# Patient Record
Sex: FEMALE | Race: BLACK OR AFRICAN AMERICAN | Employment: FULL TIME | URBAN - METROPOLITAN AREA
[De-identification: names, ages, dates, MRNs, and addresses within clinical notes are randomized per-mention and may not be internally consistent; named-entity substitution may affect disease eponyms.]

---

## 2018-04-16 ENCOUNTER — THERAPY VISIT (OUTPATIENT)
Dept: PHYSICAL THERAPY | Facility: CLINIC | Age: 48
End: 2018-04-16
Payer: OTHER MISCELLANEOUS

## 2018-04-16 DIAGNOSIS — M54.50 LUMBAGO: ICD-10-CM

## 2018-04-16 DIAGNOSIS — M25.551 HIP PAIN, RIGHT: Primary | ICD-10-CM

## 2018-04-16 PROCEDURE — 97110 THERAPEUTIC EXERCISES: CPT | Mod: GP | Performed by: PHYSICAL THERAPIST

## 2018-04-16 PROCEDURE — 97161 PT EVAL LOW COMPLEX 20 MIN: CPT | Mod: GP | Performed by: PHYSICAL THERAPIST

## 2018-04-16 ASSESSMENT — ACTIVITIES OF DAILY LIVING (ADL)
GETTING_INTO_AND_OUT_OF_A_BATHTUB: SLIGHT DIFFICULTY
GOING_UP_1_FLIGHT_OF_STAIRS: MODERATE DIFFICULTY
STANDING_FOR_15_MINUTES: MODERATE DIFFICULTY
ROLLING_OVER_IN_BED: EXTREME DIFFICULTY
GOING_DOWN_1_FLIGHT_OF_STAIRS: MODERATE DIFFICULTY
HOS_ADL_ITEM_SCORE_TOTAL: 24
HOS_ADL_COUNT: 10
PUTTING_ON_SOCKS_AND_SHOES: NO DIFFICULTY AT ALL
STEPPING_UP_AND_DOWN_CURBS: SLIGHT DIFFICULTY
HOS_ADL_HIGHEST_POTENTIAL_SCORE: 40
HEAVY_WORK: MODERATE DIFFICULTY
LIGHT_TO_MODERATE_WORK: SLIGHT DIFFICULTY
SITTING_FOR_15_MINUTES: MODERATE DIFFICULTY
WALKING_INITIALLY: NO DIFFICULTY AT ALL
GETTING_INTO_AND_OUT_OF_AN_AVERAGE_CAR: MODERATE DIFFICULTY

## 2018-04-16 NOTE — MR AVS SNAPSHOT
"              After Visit Summary   4/16/2018    Nieves Connell    MRN: [id number]           Patient Information     Date Of Birth          1970        Visit Information        Provider Department      4/16/2018 4:40 PM Bonnie Mcmahon PT [facility]        Today's Diagnoses     Hip pain, right    -  1    Lumbago           Follow-ups after your visit        Your next 10 appointments already scheduled     Apr 23, 2018 12:30 PM CDT   RENITA Extremity with Gladis Osborne ATC   [facility] ([facility]  )    [address] [place] [id number] [phone]            Apr 30, 2018 12:30 PM CDT   RENITA Extremity with Gladis Osborne ATC   [facility] ([facility]  )    [address] [place] [id number] [phone]              Who to contact     If you have questions or need follow up information about today's clinic visit or your schedule please contact [facility] PHYSICAL THERAPY directly at [phone].  Normal or non-critical lab and imaging results will be communicated to you by MyChart, letter or phone within 4 business days after the clinic has received the results. If you do not hear from us within 7 days, please contact the clinic through [employer]ed Inference[employer] or phone. If you have a critical or abnormal lab result, we will notify you by phone as soon as possible.  Submit refill requests through Tradoria or call your pharmacy and they will forward the refill request to us. Please allow 3 business days for your refill to be completed.          Additional Information About Your Visit        MyChart Information     Tradoria lets you send messages to your doctor, view your test results, renew your prescriptions, schedule appointments and more. To sign up, go to [email or web address] . Click on \"Log " "in\" on the left side of the screen, which will take you to the Welcome page. Then click on \"Sign up Now\" on the right side of the page.     You will be asked to enter the access code listed below, as well as some personal information. Please follow the directions to create your username and password.     Your access code is: [email or web address]  Expires: 7/15/2018  9:56 PM     Your access code will  in 90 days. If you need help or a new code, please call your [place] clinic or [phone].        Care EveryWhere ID     This is your Care EveryWhere ID. This could be used by other organizations to access your [place] medical records  [id number]         Blood Pressure from Last 3 Encounters:   14 118/80   14 112/82   13 126/82    Weight from Last 3 Encounters:   07 129.3 kg (285 lb)              We Performed the Following     HC PT EVAL, LOW COMPLEXITY     RENITA INITIAL EVAL REPORT     THERAPEUTIC EXERCISES        Primary Care Provider Office Phone # Fax #    Martha Desouza [phone]       [facility] [address] [facility] [address]        Equal Access to Services     GUILLERMO SERNA : Hadii homero ku hadasho Soomaali, waaxda luqadaha, qaybta kaalmada adeegyada, waxay angeliain haysergon sarabjit toscano. So [facility] [phone].    ATENCIÓN: Si habla español, tiene a mcnulty disposición servicios gratuitos de asistencia lingüística. Llame al [phone].    We comply with applicable federal civil rights laws and [place] laws. We do not discriminate on the basis of race, color, national origin, age, disability, sex, sexual orientation, or gender identity.            Thank you!     Thank you for choosing [facility] PHYSICAL THERAP[facility]  for your care. Our goal is always to provide you with excellent care. Hearing back from our patients is one way we can continue to improve our services. Please take a few minutes to complete the written survey " that you may receive in the mail after your visit with us. Thank you!             Your Updated Medication List - Protect others around you: Learn how to safely use, store and throw away your medicines at www.disposemymeds.org.          This list is accurate as of 4/16/18  9:56 PM.  Always use your most recent med list.                   Brand Name Dispense Instructions for use Diagnosis    cyclobenzaprine 10 MG tablet    FLEXERIL     Take 10 mg by mouth daily as needed.        diphenhydrAMINE aminophen 25 MG ED starter pack    BENADRYL     Take 1 capsule by mouth daily.        hydrocortisone 2.5 % cream      Apply  topically daily. Patient does not always use it daily        hydroxychloroquine 200 MG tablet    PLAQUENIL     Take 2 tablets by mouth daily        predniSONE 10 MG tablet    DELTASONE     Take  by mouth daily.        TRIAMCINOLONE & EMOLLIENT EX      Externally apply  topically daily. Patient does not always use daily        vitamin D 41986 UNIT capsule    ERGOCALCIFEROL    16 capsule    Take 1 capsule (50,000 Units) by mouth See Admin Instructions for 8 doses    Disturbance of skin sensation

## 2018-04-16 NOTE — PROGRESS NOTES
[facility]  System  Physical Exam  General   [facility]          Physical Therapy Initial Examination/Evaluation April 16, 2018   Nieves Connell is a 47 year old female referred to physical therapy for treatment of R hip and Leg pain  with Precautions/Restrictions/MD instructions Pt having order sent over   Therapist Assessment:   Clinical Impression: Pt  presents to [facility] with primary complaint of R hip and Leg pain.  Evaluation ongoing as pt arriving late to appt and needing time to fill out paperwork. Pain symptoms irritable this date so difficult to identify cause with special testing.   Per clinical examination, pt does appear to prefer extension-based movement.  Pain increasing in R hip/groin with active activation of R LE.  Pain also present with palpation to R glutes. Pt will benefit from skilled physical therapy for stretching and stabilization program. Pt also may benefit from MET for pelvic asymmetry.   Subjective: Pt fell onto buttocks with brunt of force on R side. Pain started on R side and has transitioned to midline with pain radiating down R LE all the way to her toes.  Pain also radiates into groin and around the front of her leg.  Pt purchased a pillow to sit on, which was helpful.    DOI/onset: 3/6/2018   Mechanism of injury: slip and fall on ice   DOS NA   Related PMH: chronic dislocation of R shoulder Previous treatment: went to chiropractor today for pain  Imaging: MRI of R hip and pelvis per pt were negative   Chief Complaint: R hip and LE pain    Pain: rest 6/10, activity 8/10  Described as: burning/fire Alleviated by: Standing , movement Exacerbated by:  Sitting, standing, walking too long Progression of symptoms since initial onset: improving  Time of day when pain is worse: none in particular   Sleeping: Sleeps on stomach and side; typically is a stomach sleeper    Social history: Lives with 23 y.o. Son ; finishing bachelor degree in sociology    Occupation:    Job duties: Prolonged sitting, computer work    Current HEP/exercise regimen: No formal exercise   Patient's goals are decrease pain    Other pertinent PMH: overweight, numbness/tingling General health as reported by patient: Good    Return to MD: prn   Lumbar Spine Evaluation  Static Posture  Foot: Pes planus/cavus: WNL    Knee: Varus/Valgus: slight valgus     Hip: Adduction/IR: slight IR    Lumbar Spine:  Normal lordosis,  Anterior pelvic tilt  L ASIS higher than R ASIS    Trunk Range of Motion  Flexion: WNL  Extension: 75%   Side bendin% to the R, 50% to the L with stretching on R side  Rotation: 75% to the L no pain; 50% to the R with pain   Hamstring: WNL bilat    Quadriceps/Hip Flexor: WNL      Hip and Knee Strength   MMT Hip Abduction Hip Extension Hip Flexion    Left 4/5 4-/5 4/5   Right 4-/5 4-/5 4-/5     Special Tests  Neural tension: negative  Myotomes: Negative    Assessment/Plan:    Patient is a 47 year old female with lumbar and right side hip complaints.    Patient has the following significant findings with corresponding treatment plan.                Diagnosis 1:  Low Back Pain, R hip pain   Pain -  hot/cold therapy, manual therapy, education, directional preference exercise and home program  Decreased ROM/flexibility - manual therapy, therapeutic exercise, therapeutic activity and home program  Decreased strength - therapeutic exercise, therapeutic activities and home program  Impaired muscle performance - neuro re-education and home program  Decreased function - therapeutic activities and home program    Therapy Evaluation Codes:   1) History comprised of:   Personal factors that impact the plan of care:      Past/current experiences and Time since onset of symptoms.    Comorbidity factors that impact the plan of care are:      Numbness/tingling and Overweight.     Medications impacting care: Anti-inflammatory and Pain.  2) Examination of Body Systems comprised of:   Body structures  and functions that impact the plan of care:      Hip and Lumbar spine.   Activity limitations that impact the plan of care are:      Sitting, Standing, Walking and Working.  3) Clinical presentation characteristics are:   Stable/Uncomplicated.  4) Decision-Making    Low complexity using standardized patient assessment instrument and/or measureable assessment of functional outcome.  Cumulative Therapy Evaluation is: Low complexity.    Previous and current functional limitations:  (See Goal Flow Sheet for this information)    Short term and Long term goals: (See Goal Flow Sheet for this information)     Communication ability:  Patient appears to be able to clearly communicate and understand verbal and written communication and follow directions correctly.  Treatment Explanation - The following has been discussed with the patient:   RX ordered/plan of care  Anticipated outcomes  Possible risks and side effects  This patient would benefit from PT intervention to resume normal activities.   Rehab potential is good.    Frequency:  1 X week, once daily  Duration:  for 6 weeks  Discharge Plan:  Achieve all LTG.  Independent in home treatment program.  Reach maximal therapeutic benefit.    Please refer to the daily flowsheet for treatment today, total treatment time and time spent performing 1:1 timed codes.        .

## 2018-04-16 NOTE — LETTER
[facility] PHYSICAL THERAPY  [address] [address]  [phone]  2018    Re: Nieves Connell   :   1970  MRN:  [id number]   REFERRING PHYSICIAN:   Zohaib Hyatt    [facility] PHYSICAL [facility]    Date of Initial Evaluation:  18  Visits:  Rxs Used: 1  Reason for Referral:     Hip pain, right  Lumbago    Physical Therapy Initial Examination/Evaluation 2018   Nieves Connell is a 47 year old female referred to physical therapy for treatment of R hip and Leg pain  with Precautions/Restrictions/MD instructions Pt having order sent over   Therapist Assessment:   Clinical Impression: Pt  presents to [facility] with primary complaint of R hip and Leg pain.  Evaluation ongoing as pt arriving late to appt and needing time to fill out paperwork. Pain symptoms irritable this date so difficult to identify cause with special testing.   Per clinical examination, pt does appear to prefer extension-based movement.  Pain increasing in R hip/groin with active activation of R LE.  Pain also present with palpation to R glutes. Pt will benefit from skilled physical therapy for stretching and stabilization program. Pt also may benefit from MET for pelvic asymmetry.   Subjective: Pt fell onto buttocks with brunt of force on R side. Pain started on R side and has transitioned to midline with pain radiating down R LE all the way to her toes.  Pain also radiates into groin and around the front of her leg.  Pt purchased a pillow to sit on, which was helpful.    DOI/onset: 3/6/2018   Mechanism of injury: slip and fall on ice   DOS NA   Related PMH: chronic dislocation of R shoulder Previous treatment: went to chiropractor today for pain  Imaging: MRI of R hip and pelvis per pt were negative   Chief Complaint: R hip and LE pain    Pain: rest 6/10, activity 8/10   Described as: burning/fire Alleviated by: Standing , movement  Exacerbated by:  Sitting, standing, walking too long Progression of symptoms since initial  Re: Nieves SP Connell   :   1970    onset: improving  Time of day when pain is worse: none in particular   Sleeping: Sleeps on stomach and side; typically is a stomach sleeper    Social history: Lives with 23 y.o. Son ; finishing bachelor degree in sociology    Occupation:   Job duties: Prolonged sitting, computer work    Current HEP/exercise regimen: No formal exercise   Patient's goals are decrease pain    Other pertinent PMH: overweight, numbness/tingling General health as reported by patient: Good    Return to MD: prn   Lumbar Spine Evaluation  Static Posture  Foot: Pes planus/cavus: WNL    Knee: Varus/Valgus: slight valgus    Hip: Adduction/IR: slight IR  Lumbar Spine:  Normal lordosis,  Anterior pelvic tilt  L ASIS higher than R ASIS    Trunk Range of Motion  Flexion: WNL  Extension: 75%   Side bendin% to the R, 50% to the L with stretching on R side  Rotation: 75% to the L no pain; 50% to the R with pain   Hamstring: WNL bilat    Quadriceps/Hip Flexor: WNL    Hip and Knee Strength   MMT Hip Abduction Hip Extension Hip Flexion    Left 4/5 4-/5 4/5   Right 4-/5 4-/5 4-/5     Special Tests  Neural tension: negative  Myotomes: Negative  Assessment/Plan:    Patient is a 47 year old female with lumbar and right side hip complaints.    Patient has the following significant findings with corresponding treatment plan.                Diagnosis 1:  Low Back Pain, R hip pain   Pain -  hot/cold therapy, manual therapy, education, directional preference exercise and home program  Decreased ROM/flexibility - manual therapy, therapeutic exercise, therapeutic activity and home program  Decreased strength - therapeutic exercise, therapeutic activities and home program  Impaired muscle performance - neuro re-education and home program  Decreased  function - therapeutic activities and home program    Re: Nieves Connell   :   1970    Therapy Evaluation Codes:   1) History comprised of:   Personal factors that impact the plan of care:      Past/current experiences and Time since onset of symptoms.    Comorbidity factors that impact the plan of care are:      Numbness/tingling and Overweight.     Medications impacting care: Anti-inflammatory and Pain.  2) Examination of Body Systems comprised of:   Body structures and functions that impact the plan of care:      Hip and Lumbar spine.   Activity limitations that impact the plan of care are:      Sitting, Standing, Walking and Working.  3) Clinical presentation characteristics are:   Stable/Uncomplicated.  4) Decision-Making    Low complexity using standardized patient assessment instrument and/or   measureable assessment of functional outcome.  Cumulative Therapy Evaluation is: Low complexity.    Previous and current functional limitations:  (See Goal Flow Sheet for this information)    Short term and Long term goals: (See Goal Flow Sheet for this information)     Communication ability:  Patient appears to be able to clearly communicate and understand verbal and written communication and follow directions correctly.  Treatment Explanation - The following has been discussed with the patient:   RX ordered/plan of care  Anticipated outcomes  Possible risks and side effects  This patient would benefit from PT intervention to resume normal activities.   Rehab potential is good.    Frequency:  1 X week, once daily  Duration:  for 6 weeks  Discharge Plan:  Achieve all LTG.  Independent in home treatment program.  Reach maximal therapeutic benefit.    Please refer to the daily flowsheet for treatment today, total treatment time and time spent performing 1:1 timed codes.        Thank you for your referral.    INQUIRIES  Therapist: Bonnie Mcmahon DPT  [facility]  [address] [address]  Phone: [phone]  Fax: [phone]

## 2018-04-23 ENCOUNTER — THERAPY VISIT (OUTPATIENT)
Dept: PHYSICAL THERAPY | Facility: CLINIC | Age: 48
End: 2018-04-23
Payer: OTHER MISCELLANEOUS

## 2018-04-23 DIAGNOSIS — M54.50 LUMBAGO: ICD-10-CM

## 2018-04-23 DIAGNOSIS — M25.551 HIP PAIN, RIGHT: Primary | ICD-10-CM

## 2018-04-23 PROCEDURE — 97112 NEUROMUSCULAR REEDUCATION: CPT | Mod: GP

## 2018-04-23 PROCEDURE — 97110 THERAPEUTIC EXERCISES: CPT | Mod: GP

## 2018-05-07 ENCOUNTER — THERAPY VISIT (OUTPATIENT)
Dept: PHYSICAL THERAPY | Facility: CLINIC | Age: 48
End: 2018-05-07
Payer: OTHER MISCELLANEOUS

## 2018-05-07 DIAGNOSIS — M25.551 HIP PAIN, RIGHT: Primary | ICD-10-CM

## 2018-05-07 DIAGNOSIS — M54.50 LUMBAGO: ICD-10-CM

## 2018-05-07 PROCEDURE — 97112 NEUROMUSCULAR REEDUCATION: CPT | Mod: GP

## 2018-05-07 PROCEDURE — 97110 THERAPEUTIC EXERCISES: CPT | Mod: GP

## 2018-05-14 ENCOUNTER — THERAPY VISIT (OUTPATIENT)
Dept: PHYSICAL THERAPY | Facility: CLINIC | Age: 48
End: 2018-05-14
Payer: OTHER MISCELLANEOUS

## 2018-05-14 DIAGNOSIS — M54.50 LUMBAGO: ICD-10-CM

## 2018-05-14 DIAGNOSIS — M25.551 HIP PAIN, RIGHT: Primary | ICD-10-CM

## 2018-05-14 PROCEDURE — 97112 NEUROMUSCULAR REEDUCATION: CPT | Mod: GP

## 2018-05-14 PROCEDURE — 97110 THERAPEUTIC EXERCISES: CPT | Mod: GP

## 2018-05-14 NOTE — MR AVS SNAPSHOT
"              After Visit Summary   5/14/2018    Nieves Connell    MRN: [id number]           Patient Information     Date Of Birth          1970        Visit Information        Provider Department      5/14/2018 10:30 AM Gladis Osborne ATC [facility]        Today's Diagnoses     Hip pain, right    -  1    Lumbago           Follow-ups after your visit        Your next 10 appointments already scheduled     May 21, 2018  3:40 PM CDT   RENITA Extremity with Gladis Osborne ATC   [facility] ([facility]  )    [address] [place] [address]   [phone]            May 30, 2018  3:20 PM CDT   RENITA Extremity with Gladis Osborne ATC   [facility] ([facility]  )    [address] [place] [address] [phone]              Who to contact     If you have questions or need follow up information about today's clinic visit or your schedule please contact [facility] PHYSICAL THERAPY directly at [phone].  Normal or non-critical lab and imaging results will be communicated to you by MyChart, letter or phone within 4 business days after the clinic has received the results. If you do not hear from us within 7 days, please contact the clinic through [employer] or phone. If you have a critical or abnormal lab result, we will notify you by phone as soon as possible.  Submit refill requests through Mobilepolice or call your pharmacy and they will forward the refill request to us. Please allow 3 business days for your refill to be completed.          Additional Information About Your Visit        MyChart Information     Mobile[employer] lets you send messages to your doctor, view your test results, renew your prescriptions, schedule appointments and more. To sign up, go to [email or web address] . Click on \"Log " "in\" on the left side of the screen, which will take you to the Welcome page. Then click on \"Sign up Now\" on the right side of the page.     You will be asked to enter the access code listed below, as well as some personal information. Please follow the directions to create your username and password.     Your access code is: [email or web address]  Expires: 7/15/2018  9:56 PM     Your access code will  in 90 days. If you need help or a new code, please call your [place] clinic or [phone].        Care EveryWhere ID     This is your Care EveryWhere ID. This could be used by other organizations to access your [place] medical records  [id number]         Blood Pressure from Last 3 Encounters:   14 118/80   14 112/82   13 126/82    Weight from Last 3 Encounters:   07 129.3 kg (285 lb)              We Performed the Following     Neuromuscular Re-Education     Therapeutic Exercises        Primary Care Provider Office Phone # Fax #    Martha Gomez Deo [phone]       [facility] [address] [facility] [address]        Equal Access to Services     GUILLERMO SERNA AH: Hadii homero lemuso Soefren, waaxda luqadaha, qaybta kaalmada adeyomairayada, eros toscano. So [facility] [phone].    ATENCIÓN: Si habla español, tiene a mcnulty disposición servicios gratuitos de asistencia lingüística. Jef al [phone].    We comply with applicable federal civil rights laws and [place] laws. We do not discriminate on the basis of race, color, national origin, age, disability, sex, sexual orientation, or gender identity.            Thank you!     Thank you for choosing [facility] PH[facility]ICAL THERAP[facility]  for your care. Our goal is always to provide you with excellent care. Hearing back from our patients is one way we can continue to improve our services. Please take a few minutes to complete the written survey that you may receive in the " mail after your visit with us. Thank you!             Your Updated Medication List - Protect others around you: Learn how to safely use, store and throw away your medicines at www.disposemymeds.org.          This list is accurate as of 5/14/18 12:13 PM.  Always use your most recent med list.                   Brand Name Dispense Instructions for use Diagnosis    cyclobenzaprine 10 MG tablet    FLEXERIL     Take 10 mg by mouth daily as needed.        diphenhydrAMINE aminophen 25 MG ED starter pack    BENADRYL     Take 1 capsule by mouth daily.        hydrocortisone 2.5 % cream      Apply  topically daily. Patient does not always use it daily        hydroxychloroquine 200 MG tablet    PLAQUENIL     Take 2 tablets by mouth daily        predniSONE 10 MG tablet    DELTASONE     Take  by mouth daily.        TRIAMCINOLONE & EMOLLIENT EX      Externally apply  topically daily. Patient does not always use daily        vitamin D 86352 UNIT capsule    ERGOCALCIFEROL    16 capsule    Take 1 capsule (50,000 Units) by mouth See Admin Instructions for 8 doses    Disturbance of skin sensation

## 2018-05-14 NOTE — LETTER
[facility] ATHLETIC [facility] PHYSICAL THERAPY  [address] [facility] [address]  [phone]  May 14, 2018    Re: Nieves SNOWDEN Zeina Connell   :   1970  MRN:  [id number]   REFERRING PHYSICIAN:   Zohaib Hyatt    [facility] ATHLETIC [facility] PHYSICAL [facility]    Date of Initial Evaluation:  18  Visits:  Rxs Used: 4  Reason for Referral:     Lumbago  Hip pain, right  PROGRESS  REPORT    Progress reporting period is from 2018 to 2018.     SUBJECTIVE  Subjective: doing well until she went to work this morning and started having burning in leg after about 3 hours, an had been closer to the 5 hour farhan.Sitting position triggers the pain.   Progression of exercises are slow.  Patient is deconditioned and fatigues easily but willing to try exercises as she can Current Pain level: 6/10   Initial Pain level: 8/10   Changes in function: Yes, see goal flow sheet for change in function. beginning to tolerate sitting for a longer period of time but progress is still volital  Adverse reactions: None  The subjective and objective information are from the last SOAP note on this patient.    OBJECTIVE  Objective:  along the right side SI joint- tender to the touch, burning in leg down to toes. lsight pelvic rotation noted as well. Poor muscle strenght to progress ex program, but was doing a few ex to help stabilize pelvis       ASSESSMENT/PLAN  Updated problem list and treatment plan: Diagnosis 1:  Right hip pain, SI (area)pain  Pain -  hot/cold therapy, manual therapy, splint/taping/bracing/orthotics and self management  Decreased strength - therapeutic exercise and home program  STG/LTGs have been met or progress has been made towards goals:  Yes (See Goal flow sheet completed today.)  Assessment of Progress: The patient's condition has potential to improve.  Self Management Plans:  Patient has been instructed in a home treatment program.    Re: Nieves  SP Connell   :   1970    I have re-evaluated this patient and find that the nature, scope, duration and intensity of the therapy is appropriate for the medical condition of the patient.  Nieves continues to require the following intervention to meet STG and LTG's: PT  The patient is returning to your office for a recheck appointment.    Recommendations:  This patient would benefit from continued therapy.     Frequency:  1  X week, once daily  Duration:  for 4 additional  weeks      Would also like to order and SI belt for Patient.  When we have tried in clinic, the pain reduces.  Decreases pressure at the SI joint and gives her pelvic stability that we are working to improve with exercises at PT  Please refer to the attached sheet to sign and return to clinic and I will send to  adjustor for approval.  Fax: [phone]      Please refer to the daily flowsheet for treatment today, total treatment time and time spent performing 1:1 timed codes.        Thank you for your referral.    INQUIRIES  Therapist: Bonnie Mcmahon DPT  [facility] PH[facility]ICAL THERAP[facility]  [address] [place] [address]  Phone: [phone]  Fax: [phone]

## 2018-05-21 ENCOUNTER — THERAPY VISIT (OUTPATIENT)
Dept: PHYSICAL THERAPY | Facility: CLINIC | Age: 48
End: 2018-05-21
Payer: OTHER MISCELLANEOUS

## 2018-05-21 DIAGNOSIS — M54.50 LUMBAGO: ICD-10-CM

## 2018-05-21 PROCEDURE — 97140 MANUAL THERAPY 1/> REGIONS: CPT | Mod: GP | Performed by: PHYSICAL THERAPIST

## 2018-05-21 PROCEDURE — 97112 NEUROMUSCULAR REEDUCATION: CPT | Mod: GP | Performed by: PHYSICAL THERAPIST

## 2018-05-21 PROCEDURE — 97110 THERAPEUTIC EXERCISES: CPT | Mod: GP | Performed by: PHYSICAL THERAPIST

## 2018-05-21 NOTE — PROGRESS NOTES
"  HPI  System  Physical Exam  General   ROS    PROGRESS  REPORT        SUBJECTIVE  Subjective: Pt reports the zingers down the R side were improving last week However, has had a new type of pain into L buttocks. She is unable to determine if this is just pain from activating muscels from HEP. Pt went to chiropractor right before this appt and reports that her R side was \"all messed up\". She did do more walking yesterday at the [employer] game.   .     Changes in function:  Minimal change in function   Adverse reaction to treatment or activity: None    OBJECTIVE    Objective: Trial of SI belt with continued improvement in pain symptoms.  has approved belt. Continued difficulty with both TA and glute contraction with irritable pain symtpoms. Improved muscle activation noted in prone position. Trial of gentle R hip mobilizations followed by AROM.      ASSESSMENT/PLAN  Updated problem list and treatment plan: Diagnosis 1:  R hip pain, lumbago2  Pain -  hot/cold therapy, manual therapy, self management, education and home program  Decreased ROM/flexibility - manual therapy, therapeutic exercise, therapeutic activity and home program  Decreased joint mobility - manual therapy, therapeutic exercise, therapeutic activity and home program  Decreased strength - therapeutic exercise, therapeutic activities and home program  Impaired muscle performance - neuro re-education and home program  Decreased function - therapeutic activities and home program  STG/LTGs have been met or progress has been made towards goals:  Slow progress towards goals  Assessment of Progress: The patient's condition has potential to improve.  Self Management Plans:  Patient has been instructed in a home treatment program.  I have re-evaluated this patient and find that the nature, scope, duration and intensity of the therapy is appropriate for the medical condition of the patient.  Nieves continues to require the following intervention to meet STG and " LTG's:  PT    Recommendations:  This patient would benefit from continued therapy.     Frequency:  1 X week, once daily  Duration:  for 3 weeks        Please refer to the daily flowsheet for treatment today, total treatment time and time spent performing 1:1 timed codes.

## 2018-05-21 NOTE — MR AVS SNAPSHOT
"              After Visit Summary   5/21/2018    Nieves Connell    MRN: [id number]           Patient Information     Date Of Birth          1970        Visit Information        Provider Department      5/21/2018 3:20 PM Bonnie Mcmahon PT [facility] Physical Therapy        Today's Diagnoses     Lumbago           Follow-ups after your visit        Your next 10 appointments already scheduled     May 30, 2018  3:20 PM CDT   RENITA Extremity with Gladis Osborne, ALEXANDRA   [facility] Physical Therapy ([facility]  )    [address] [address] [phone]              Who to contact     If you have questions or need follow up information about today's clinic visit or your schedule please contact [facility] PHYSICAL [facility] directly at [phone].  Normal or non-critical lab and imaging results will be communicated to you by MyChart, letter or phone within 4 business days after the clinic has received the results. If you do not hear from us within 7 days, please contact the clinic through [employer] or phone. If you have a critical or abnormal lab result, we will notify you by phone as soon as possible.  Submit refill requests through [employer] or call your pharmacy and they will forward the refill request to us. Please allow 3 business days for your refill to be completed.          Additional Information About Your Visit        MyChart Information     [employer] lets you send messages to your doctor, view your test results, renew your prescriptions, schedule appointments and more. To sign up, go to [email or web address] . Click on \"Log in\" on the left side of the screen, which will take you to the Welcome page. Then click on \"Sign up Now\" on the right side of the page.     You will be asked to enter the access code listed below, as well as some personal information. Please follow the " directions to create your username and password.     Your access code is: [email or web address]  Expires: 7/15/2018  9:56 PM     Your access code will  in 90 days. If you need help or a new code, please call your [place] clinic or [phone].        Care EveryWhere ID     This is your Care EveryWhere ID. This could be used by other organizations to access your [place] medical records  [id number]         Blood Pressure from Last 3 Encounters:   14 118/80   14 112/82   13 126/82    Weight from Last 3 Encounters:   07 129.3 kg (285 lb)              We Performed the Following     RENITA PROGRESS NOTES REPORT     MANUAL THER TECH,1+REGIONS,EA 15 MIN     NEUROMUSCULAR RE-EDUCATION     THERAPEUTIC EXERCISES        Primary Care Provider Office Phone # Fax #    Martha Desouza [phone]       [facility] [address] [facility] [address]        Equal Access to Services     GUILLERMO SERNA : Hadii aad ku hadasho Soomaali, waaxda luqadaha, qaybta kaalmada adeegyada, waxay idiin hayaan adeeg baldomeroarayossi la'lin . So [facility] [phone].    ATENCIÓN: Si habla español, tiene a mcnulty disposición servicios gratuitos de asistencia lingüística. [facility] [phone].    We comply with applicable federal civil rights laws and [place] laws. We do not discriminate on the basis of race, color, national origin, age, disability, sex, sexual orientation, or gender identity.            Thank you!     Thank you for choosing [facility] PHYSICAL THERAPY  for your care. Our goal is always to provide you with excellent care. Hearing back from our patients is one way we can continue to improve our services. Please take a few minutes to complete the written survey that you may receive in the mail after your visit with us. Thank you!             Your Updated Medication List - Protect others around you: Learn how to safely use, store and throw away your medicines at  www.disposemymeds.org.          This list is accurate as of 5/21/18  6:02 PM.  Always use your most recent med list.                   Brand Name Dispense Instructions for use Diagnosis    cyclobenzaprine 10 MG tablet    FLEXERIL     Take 10 mg by mouth daily as needed.        diphenhydrAMINE aminophen 25 MG ED starter pack    BENADRYL     Take 1 capsule by mouth daily.        hydrocortisone 2.5 % cream      Apply  topically daily. Patient does not always use it daily        hydroxychloroquine 200 MG tablet    PLAQUENIL     Take 2 tablets by mouth daily        predniSONE 10 MG tablet    DELTASONE     Take  by mouth daily.        TRIAMCINOLONE & EMOLLIENT EX      Externally apply  topically daily. Patient does not always use daily        vitamin D 11125 UNIT capsule    ERGOCALCIFEROL    16 capsule    Take 1 capsule (50,000 Units) by mouth See Admin Instructions for 8 doses    Disturbance of skin sensation

## 2018-05-30 ENCOUNTER — THERAPY VISIT (OUTPATIENT)
Dept: PHYSICAL THERAPY | Facility: CLINIC | Age: 48
End: 2018-05-30
Payer: OTHER MISCELLANEOUS

## 2018-05-30 DIAGNOSIS — M54.50 LUMBAGO: ICD-10-CM

## 2018-05-30 PROCEDURE — 97112 NEUROMUSCULAR REEDUCATION: CPT | Mod: GP

## 2018-05-30 PROCEDURE — 97140 MANUAL THERAPY 1/> REGIONS: CPT | Mod: GP

## 2018-05-30 PROCEDURE — 97110 THERAPEUTIC EXERCISES: CPT | Mod: GP

## 2018-06-06 ENCOUNTER — THERAPY VISIT (OUTPATIENT)
Dept: PHYSICAL THERAPY | Facility: CLINIC | Age: 48
End: 2018-06-06
Payer: OTHER MISCELLANEOUS

## 2018-06-06 DIAGNOSIS — M54.50 LUMBAGO: ICD-10-CM

## 2018-06-06 PROCEDURE — 97112 NEUROMUSCULAR REEDUCATION: CPT | Mod: GP

## 2018-06-06 PROCEDURE — 97110 THERAPEUTIC EXERCISES: CPT | Mod: GP

## 2018-06-06 PROCEDURE — 97140 MANUAL THERAPY 1/> REGIONS: CPT | Mod: GP

## 2018-06-14 ENCOUNTER — THERAPY VISIT (OUTPATIENT)
Dept: PHYSICAL THERAPY | Facility: CLINIC | Age: 48
End: 2018-06-14
Payer: OTHER MISCELLANEOUS

## 2018-06-14 DIAGNOSIS — M54.50 LUMBAGO: ICD-10-CM

## 2018-06-14 PROCEDURE — 97140 MANUAL THERAPY 1/> REGIONS: CPT | Mod: GP

## 2018-06-14 PROCEDURE — 97112 NEUROMUSCULAR REEDUCATION: CPT | Mod: GP

## 2018-06-14 PROCEDURE — 97110 THERAPEUTIC EXERCISES: CPT | Mod: GP

## 2018-06-21 ENCOUNTER — THERAPY VISIT (OUTPATIENT)
Dept: PHYSICAL THERAPY | Facility: CLINIC | Age: 48
End: 2018-06-21
Payer: OTHER MISCELLANEOUS

## 2018-06-21 DIAGNOSIS — M54.50 LUMBAGO: ICD-10-CM

## 2018-06-21 PROCEDURE — 97110 THERAPEUTIC EXERCISES: CPT | Mod: GP

## 2018-06-21 PROCEDURE — 97112 NEUROMUSCULAR REEDUCATION: CPT | Mod: GP

## 2018-06-21 NOTE — MR AVS SNAPSHOT
After Visit Summary   6/21/2018    Nieves Connell    MRN: [id number]           Patient Information     Date Of Birth          1970        Visit Information        Provider Department      6/21/2018 3:10 PM Gladis Osborne AT[facility] Physical Therapy        Today's Diagnoses     Lumbago           Follow-ups after your visit        Who to contact     If you have questions or need follow up information about today's clinic visit or your schedule please contact [facility] ATHLETIC [facility] PHYSICAL [facility] directly at [phone].  Normal or non-critical lab and imaging results will be communicated to you by MyChart, letter or phone within 4 business days after the clinic has received the results. If you do not hear from us within 7 days, please contact the clinic through MyChart or phone. If you have a critical or abnormal lab result, we will notify you by phone as soon as possible.  Submit refill requests through [employer] or call your pharmacy and they will forward the refill request to us. Please allow 3 business days for your refill to be completed.          Additional Information About Your Visit        Care EveryWhere ID     This is your Care EveryWhere ID. This could be used by other organizations to access your [place] medical records  [id number]         Blood Pressure from Last 3 Encounters:   04/28/14 118/80   04/09/14 112/82   06/28/13 126/82    Weight from Last 3 Encounters:   08/23/07 129.3 kg (285 lb)              We Performed the Following     Neuromuscular Re-Education     Therapeutic Exercises        Primary Care Provider Office Phone # Fax #    Martha Solizzell [phone]       [facility] [address] [address]        Equal Access to Services     GUILLERMO SERNA : Dany Burleson, cristian antony, eros roland.  So [facility] [phone].    ATENCIÓN: Si samson morales, tiene a mcnulty disposición servicios gratuitos de asistencia lingüística. Jef vaughn [phone].    We comply with applicable federal civil rights laws and [place] laws. We do not discriminate on the basis of race, color, national origin, age, disability, sex, sexual orientation, or gender identity.            Thank you!     Thank you for choosing [place] [facility] PHYSICAL [facility]  for your care. Our goal is always to provide you with excellent care. Hearing back from our patients is one way we can continue to improve our services. Please take a few minutes to complete the written survey that you may receive in the mail after your visit with us. Thank you!             Your Updated Medication List - Protect others around you: Learn how to safely use, store and throw away your medicines at www.disposemymeds.org.          This list is accurate as of 6/21/18 11:59 PM.  Always use your most recent med list.                   Brand Name Dispense Instructions for use Diagnosis    cyclobenzaprine 10 MG tablet    FLEXERIL     Take 10 mg by mouth daily as needed.        diphenhydrAMINE aminophen 25 MG ED starter pack    BENADRYL     Take 1 capsule by mouth daily.        hydrocortisone 2.5 % cream      Apply  topically daily. Patient does not always use it daily        hydroxychloroquine 200 MG tablet    PLAQUENIL     Take 2 tablets by mouth daily        predniSONE 10 MG tablet    DELTASONE     Take  by mouth daily.        TRIAMCINOLONE & EMOLLIENT EX      Externally apply  topically daily. Patient does not always use daily        vitamin D 91507 UNIT capsule    ERGOCALCIFEROL    16 capsule    Take 1 capsule (50,000 Units) by mouth See Admin Instructions for 8 doses    Disturbance of skin sensation

## 2018-06-25 NOTE — PROGRESS NOTES
Subjective:  HPI                    Objective:  System    Physical Exam    General     ROS    Assessment/Plan:    PROGRESS  REPORT    Progress reporting period is to 6/21/2018.     SUBJECTIVE  Subjective: overall improving, but still feels like there are highs and lows- gets a little better and then moves the wrong way and the low back/ hip is irritated again and then there is soreness for a few days, then gets back on track.    Current Pain level:  (pain varies,denpends on activity & work (how much sit/stand during the day)  Initial Pain level: 8/10   Changes in function: Yes, see goal flow sheet for change in function (better transfers from sit/ stand, and rising up from the table)   Adverse reactions: None;   ,     The objective findings are from DOS 6/21/2018.    OBJECTIVE  Objective: prone press up with right leg on table , left leg on floor.  Decreased hip/ low back pain and no anterior hip pain- able to do exercises and feel muscle work in the right location. Plan: to cont with press up for the next few days only.  IF better will add some ex back into the program.        ASSESSMENT/PLAN  Updated problem list and treatment plan: Diagnosis 1:  Low back pain/ Right side hip pain  Pain -  manual therapy, splint/taping/bracing/orthotics, self management and home program  Decreased ROM/flexibility - manual therapy, therapeutic exercise, therapeutic activity and home program  Decreased strength - therapeutic exercise, therapeutic activities and home program  Decreased proprioception - neuro re-education, therapeutic activities and home program  STG/LTGs have been met or progress has been made towards goals:  Yes (See Goal flow sheet completed today.)  Assessment of Progress: The patient's condition has potential to improve.Progress is slow.   Patient is meeting short term goals and is progressing towards long term goals.  Self Management Plans:  Patient has been instructed in a home treatment program.  I have  re-evaluated this patient and find that the nature, scope, duration and intensity of the therapy is appropriate for the medical condition of the patient.  Nieves continues to require the following intervention to meet STG and LTG's: PT  Would like to beiin transition for independent home exercise program.     Recommendations:  Patient would benefit from the following:  Would like to continue to see Patient for 2 additional visits.  The visits will be bi-monthly for self progression and transition to home program.   Additional authorization will be needed.             Please refer to the daily flowsheet for treatment today, total treatment time and time spent performing 1:1 timed codes.

## 2019-03-01 ENCOUNTER — HOSPITAL ENCOUNTER (OUTPATIENT)
Dept: MAMMOGRAPHY | Facility: CLINIC | Age: 49
Discharge: HOME | End: 2019-03-01
Attending: PHYSICIAN ASSISTANT | Admitting: PHYSICIAN ASSISTANT
Payer: COMMERCIAL

## 2019-03-01 DIAGNOSIS — Z12.31 VISIT FOR SCREENING MAMMOGRAM: ICD-10-CM

## 2019-03-01 PROCEDURE — 77067 SCR MAMMO BI INCL CAD: CPT

## 2019-06-24 NOTE — PROGRESS NOTES
Patient did not return for further treatment and no additional progress was noted.  Please refer to the progress note and goal flowsheet completed on 05/21/18 for discharge information.

## 2019-07-01 PROBLEM — M54.50 LUMBAGO: Status: RESOLVED | Noted: 2018-04-16 | Resolved: 2018-06-21
